# Patient Record
Sex: FEMALE | Race: WHITE | NOT HISPANIC OR LATINO | Employment: FULL TIME | URBAN - NONMETROPOLITAN AREA
[De-identification: names, ages, dates, MRNs, and addresses within clinical notes are randomized per-mention and may not be internally consistent; named-entity substitution may affect disease eponyms.]

---
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
## 2025-07-30 ENCOUNTER — OFFICE VISIT (OUTPATIENT)
Dept: URGENT CARE | Facility: PHYSICIAN GROUP | Age: 20
End: 2025-07-30
Payer: COMMERCIAL

## 2025-07-30 VITALS
TEMPERATURE: 99.7 F | OXYGEN SATURATION: 98 % | BODY MASS INDEX: 22.31 KG/M2 | DIASTOLIC BLOOD PRESSURE: 68 MMHG | RESPIRATION RATE: 16 BRPM | WEIGHT: 130 LBS | SYSTOLIC BLOOD PRESSURE: 110 MMHG | HEART RATE: 131 BPM

## 2025-07-30 DIAGNOSIS — J02.0 STREP PHARYNGITIS: ICD-10-CM

## 2025-07-30 DIAGNOSIS — J02.9 SORE THROAT: Primary | ICD-10-CM

## 2025-07-30 LAB — S PYO DNA SPEC NAA+PROBE: DETECTED

## 2025-07-30 RX ORDER — AMOXICILLIN 500 MG/1
500 CAPSULE ORAL 2 TIMES DAILY
Qty: 20 CAPSULE | Refills: 0 | Status: SHIPPED | OUTPATIENT
Start: 2025-07-30 | End: 2025-08-09

## 2025-07-30 NOTE — PROGRESS NOTES
Subjective:   Aubree Robbins is a 19 y.o. female who presents for Sore Throat (Sore throat, ear pain. Dizziness. Headache. Started yesterday morning. )    Patient is a 19-year-old female presented clinic today reporting 24-hour history of sore throat, right ear pain, headache, body aches, and mild dizziness.  Patient is also having hot and cold chills.  She denies any fever, rashes, nausea, or vomiting.  She has taken some Motrin which has helped some with her symptoms.  No known sick contact.        Medications, Allergies, and current problem list reviewed today in Epic.     Objective:     /68   Pulse (!) 131   Temp 37.6 °C (99.7 °F) (Temporal)   Resp 16   Wt 59 kg (130 lb)   SpO2 98%     Physical Exam  Vitals reviewed.   Constitutional:       General: She is not in acute distress.     Appearance: Normal appearance. She is not ill-appearing or toxic-appearing.   HENT:      Head: Normocephalic.      Right Ear: Tympanic membrane, ear canal and external ear normal.      Left Ear: Tympanic membrane, ear canal and external ear normal.      Nose: Nose normal.      Mouth/Throat:      Lips: Pink. No lesions.      Mouth: Mucous membranes are moist.      Pharynx: Oropharynx is clear. Uvula midline. Posterior oropharyngeal erythema present. No pharyngeal swelling, oropharyngeal exudate, uvula swelling or postnasal drip.      Tonsils: No tonsillar exudate or tonsillar abscesses. 1+ on the right. 1+ on the left.   Eyes:      Extraocular Movements: Extraocular movements intact.      Conjunctiva/sclera: Conjunctivae normal.      Pupils: Pupils are equal, round, and reactive to light.   Cardiovascular:      Rate and Rhythm: Regular rhythm. Tachycardia present.   Pulmonary:      Effort: Pulmonary effort is normal.      Breath sounds: Normal breath sounds.   Musculoskeletal:         General: Normal range of motion.      Cervical back: Normal range of motion and neck supple. No tenderness.   Lymphadenopathy:       Cervical: No cervical adenopathy.   Skin:     General: Skin is warm and dry.   Neurological:      Mental Status: She is alert and oriented to person, place, and time.   Psychiatric:         Mood and Affect: Mood normal.         Behavior: Behavior normal.         Thought Content: Thought content normal.         Judgment: Judgment normal.         Assessment/Plan:     Diagnosis and associated orders:     1. Sore throat  POCT Cepheid Group A Strep - PCR      2. Strep pharyngitis  amoxicillin (AMOXIL) 500 MG Cap         Comments/MDM:     POCT strep test was positive.  HPI and physical exam findings are also consistent with strep throat.    Management includes completion of antibiotics, new toothbrush, soft foods, increased fluids, remain home from for 24 hours.  May use Tylenol Motrin as needed help with fevers, headaches, body aches according to 's instructions.  Management of symptoms is discussed and expected course is outlined.   Medication administration is reviewed.   To return to office if no improvement 5-7 days   Patient was involved with shared decision-making throughout the exam today and verbalizes understanding regards to plan of care, discharge instructions, and follow-up         Differential diagnosis, natural history, supportive care, and indications for immediate follow-up discussed.    Advised the patient to follow-up with the primary care physician for recheck, reevaluation, and consideration of further management.    I personally reviewed prior external notes and test results pertinent to today's visit as well as additional imaging and testing completed in clinic today.     Please note that this dictation was created using voice recognition software. I have made a reasonable attempt to correct obvious errors, but I expect that there are errors of grammar and possibly content that I did not discover before finalizing the note.

## 2025-07-30 NOTE — LETTER
[facility]  [address]  [facility] [address]     July 30, 2025    Patient: Aubree Robbins   YOB: 2005   Date of Visit: 7/30/2025       To Whom It May Concern:    Aubree Robbins was seen and treated in our department on 7/30/2025.  She will need to be excused from work and may return on 8/1/2025 as long as she is fever free.    Sincerely,     JESUS Chávez.